# Patient Record
Sex: MALE | Race: WHITE | NOT HISPANIC OR LATINO | Employment: OTHER | URBAN - METROPOLITAN AREA
[De-identification: names, ages, dates, MRNs, and addresses within clinical notes are randomized per-mention and may not be internally consistent; named-entity substitution may affect disease eponyms.]

---

## 2021-03-29 ENCOUNTER — HOSPITAL ENCOUNTER (EMERGENCY)
Facility: CLINIC | Age: 66
Discharge: HOME | End: 2021-03-29
Attending: EMERGENCY MEDICINE | Admitting: EMERGENCY MEDICINE
Payer: MEDICARE

## 2021-03-29 ENCOUNTER — APPOINTMENT (OUTPATIENT)
Dept: CT IMAGING | Facility: CLINIC | Age: 66
End: 2021-03-29
Attending: EMERGENCY MEDICINE
Payer: MEDICARE

## 2021-03-29 VITALS
OXYGEN SATURATION: 96 % | WEIGHT: 232 LBS | SYSTOLIC BLOOD PRESSURE: 132 MMHG | BODY MASS INDEX: 30.75 KG/M2 | TEMPERATURE: 98.9 F | RESPIRATION RATE: 20 BRPM | HEART RATE: 91 BPM | HEIGHT: 73 IN | DIASTOLIC BLOOD PRESSURE: 86 MMHG

## 2021-03-29 DIAGNOSIS — N20.1 URETEROLITHIASIS: ICD-10-CM

## 2021-03-29 DIAGNOSIS — R10.9 RIGHT FLANK PAIN: ICD-10-CM

## 2021-03-29 LAB
ALBUMIN UR-MCNC: NEGATIVE MG/DL
ANION GAP SERPL CALCULATED.3IONS-SCNC: 2 MMOL/L (ref 3–14)
APPEARANCE UR: CLEAR
BASOPHILS # BLD AUTO: 0.1 10E9/L (ref 0–0.2)
BASOPHILS NFR BLD AUTO: 0.6 %
BILIRUB UR QL STRIP: NEGATIVE
BUN SERPL-MCNC: 15 MG/DL (ref 7–30)
CALCIUM SERPL-MCNC: 9 MG/DL (ref 8.5–10.1)
CHLORIDE SERPL-SCNC: 108 MMOL/L (ref 94–109)
CO2 SERPL-SCNC: 30 MMOL/L (ref 20–32)
COLOR UR AUTO: ABNORMAL
CREAT SERPL-MCNC: 0.98 MG/DL (ref 0.66–1.25)
DIFFERENTIAL METHOD BLD: NORMAL
EOSINOPHIL # BLD AUTO: 0.1 10E9/L (ref 0–0.7)
EOSINOPHIL NFR BLD AUTO: 1.1 %
ERYTHROCYTE [DISTWIDTH] IN BLOOD BY AUTOMATED COUNT: 12.6 % (ref 10–15)
GFR SERPL CREATININE-BSD FRML MDRD: 79 ML/MIN/{1.73_M2}
GLUCOSE SERPL-MCNC: 91 MG/DL (ref 70–99)
GLUCOSE UR STRIP-MCNC: NEGATIVE MG/DL
HCT VFR BLD AUTO: 41.8 % (ref 40–53)
HGB BLD-MCNC: 14.4 G/DL (ref 13.3–17.7)
HGB UR QL STRIP: ABNORMAL
IMM GRANULOCYTES # BLD: 0 10E9/L (ref 0–0.4)
IMM GRANULOCYTES NFR BLD: 0.2 %
KETONES UR STRIP-MCNC: NEGATIVE MG/DL
LEUKOCYTE ESTERASE UR QL STRIP: NEGATIVE
LYMPHOCYTES # BLD AUTO: 2 10E9/L (ref 0.8–5.3)
LYMPHOCYTES NFR BLD AUTO: 22.1 %
MCH RBC QN AUTO: 31 PG (ref 26.5–33)
MCHC RBC AUTO-ENTMCNC: 34.4 G/DL (ref 31.5–36.5)
MCV RBC AUTO: 90 FL (ref 78–100)
MONOCYTES # BLD AUTO: 0.9 10E9/L (ref 0–1.3)
MONOCYTES NFR BLD AUTO: 9.5 %
MUCOUS THREADS #/AREA URNS LPF: PRESENT /LPF
NEUTROPHILS # BLD AUTO: 6 10E9/L (ref 1.6–8.3)
NEUTROPHILS NFR BLD AUTO: 66.5 %
NITRATE UR QL: NEGATIVE
NRBC # BLD AUTO: 0 10*3/UL
NRBC BLD AUTO-RTO: 0 /100
PH UR STRIP: 5 PH (ref 5–7)
PLATELET # BLD AUTO: 231 10E9/L (ref 150–450)
POTASSIUM SERPL-SCNC: 3.9 MMOL/L (ref 3.4–5.3)
RBC # BLD AUTO: 4.64 10E12/L (ref 4.4–5.9)
RBC #/AREA URNS AUTO: 2 /HPF (ref 0–2)
SODIUM SERPL-SCNC: 140 MMOL/L (ref 133–144)
SOURCE: ABNORMAL
SP GR UR STRIP: 1.02 (ref 1–1.03)
SQUAMOUS #/AREA URNS AUTO: 0 /HPF (ref 0–1)
UROBILINOGEN UR STRIP-MCNC: 0 MG/DL (ref 0–2)
WBC # BLD AUTO: 9 10E9/L (ref 4–11)
WBC #/AREA URNS AUTO: 1 /HPF (ref 0–5)

## 2021-03-29 PROCEDURE — 99285 EMERGENCY DEPT VISIT HI MDM: CPT | Mod: 25

## 2021-03-29 PROCEDURE — 80048 BASIC METABOLIC PNL TOTAL CA: CPT | Performed by: EMERGENCY MEDICINE

## 2021-03-29 PROCEDURE — 74176 CT ABD & PELVIS W/O CONTRAST: CPT

## 2021-03-29 PROCEDURE — 81001 URINALYSIS AUTO W/SCOPE: CPT | Performed by: EMERGENCY MEDICINE

## 2021-03-29 PROCEDURE — 85025 COMPLETE CBC W/AUTO DIFF WBC: CPT | Performed by: EMERGENCY MEDICINE

## 2021-03-29 PROCEDURE — 250N000011 HC RX IP 250 OP 636: Performed by: EMERGENCY MEDICINE

## 2021-03-29 PROCEDURE — 96374 THER/PROPH/DIAG INJ IV PUSH: CPT

## 2021-03-29 PROCEDURE — 87086 URINE CULTURE/COLONY COUNT: CPT | Performed by: EMERGENCY MEDICINE

## 2021-03-29 RX ORDER — KETOROLAC TROMETHAMINE 15 MG/ML
15 INJECTION, SOLUTION INTRAMUSCULAR; INTRAVENOUS ONCE
Status: COMPLETED | OUTPATIENT
Start: 2021-03-29 | End: 2021-03-29

## 2021-03-29 RX ORDER — TAMSULOSIN HYDROCHLORIDE 0.4 MG/1
0.4 CAPSULE ORAL DAILY
Qty: 7 CAPSULE | Refills: 0 | Status: SHIPPED | OUTPATIENT
Start: 2021-03-29 | End: 2021-04-05

## 2021-03-29 RX ADMIN — KETOROLAC TROMETHAMINE 15 MG: 15 INJECTION, SOLUTION INTRAMUSCULAR; INTRAVENOUS at 18:19

## 2021-03-29 SDOH — HEALTH STABILITY: MENTAL HEALTH: HOW OFTEN DO YOU HAVE A DRINK CONTAINING ALCOHOL?: NEVER

## 2021-03-29 ASSESSMENT — MIFFLIN-ST. JEOR: SCORE: 1886.23

## 2021-03-29 NOTE — ED PROVIDER NOTES
"  History   Chief Complaint:  Flank Pain      HPI   Donovan Galindo is a 65 year old male, with a history of a pancreatic mass and kidney stones, who presents to the ED for evaluation of flank pain. The patient reports right-sided flank pain waking him up around 0200 this morning. He states the pain is intermittent, sharp, and uncomfortable. [facility] who told him to go to the ED for further evaluation. Upon evaluation, endorses some nausea throughout the day. Denies dysuria, hematuria, cough, shortness of breath, vomiting, diarrhea, fevers, or chills. Has not taken any medications for the pain.    Review of Systems   All other systems reviewed and are negative.    Allergies:  No Known Allergies    Medications:    The patient is not currently taking any prescribed medications.    Past Medical History:    Pancreatic mass  Benign neoplasm of colon  Basal cell carcinoma    Past Surgical History:    Hand surgery - left thumb    Family History:    Skin cancer  CAD  Diabetes    Social History:  Marital Status:   Smoking status: 1 pipe/week  Alcohol use: 3 shots whiskey/week  Drug use: No  Presents to the ED with self    Physical Exam     Patient Vitals for the past 24 hrs:   BP Temp Temp src Pulse Resp SpO2 Height Weight   03/29/21 1354 (!) 161/84 98.9  F (37.2  C) Temporal 93 16 96 % 1.854 m (6' 1\") 105.2 kg (232 lb)       Physical Exam  General: Resting on the bed.  Head: No obvious trauma to head.  Ears, Nose, Throat:  External ears normal.  Nose normal.    Eyes:  Conjunctivae clear.  Pupils are equal, round, and reactive.   Neck: Normal range of motion.  Neck supple.   CV: Regular rate and rhythm.  No murmurs.      Respiratory: Effort normal and breath sounds normal.  No wheezing or crackles.   Gastrointestinal: Soft.  No distension. There is no tenderness.  There is no rigidity, no rebound and no guarding.   Musculoskeletal: right cva tenderness   Neuro: Alert. Moving all extremities " appropriately.  Normal speech.    Skin: Skin is warm and dry.  No rash noted.     Emergency Department Course   Imaging:  Radiology findings were communicated with the patient who voiced understanding of the findings.    CT Abd/pelvis without contrast:    Reading per radiology    CT Abdomen Pelvis w/o Contrast   Preliminary Result   IMPRESSION: 4 mm stone in the distal right ureter with associated mild   hydroureter/hydronephrosis. No other radiodense kidney or ureteral   stone. No left hydronephrosis.             Laboratory:  Laboratory findings were communicated with the patient who voiced understanding of the findings.    CBC: WBC: 9.0, HGB: 14.4, PLT: 231    BMP: Anion Gap: 2 (L), o/w WNL (Creatinine: 0.98)    UA: Blood: Small, Mucous: Present, o/w Negative     Emergency Department Course:    Reviewed:  I reviewed the patient's nursing notes, vitals, past medical records, Care Everywhere.     Assessments:   I first assessed the patient and performed an exam. Discussed plans for care.     I rechecked the patient and updated them on their results. Discussed plans for discharge.    Interventions:  : Toradol 15 mg IV    Disposition:  The patient was discharged to home.       Impression & Plan      Medical Decision Makin-year-old male presents with right flank pain.  Vital signs are reassuring.  Broad differential was pursued include not limited to nephrolithiasis, pyelonephritis, electrolyte, metabolic, renal dysfunction, appendicitis, musculoskeletal, etc.  Overall patient's well-appearing nontoxic.  CBC shows no leukocytosis or anemia.  BMP shows no acute electrolyte, metabolic or renal dysfunction.  UA does have evidence of small blood but no other signs of infection.  He is right flank pain on examination.  CT shows 4 mm distal nearly UVJ stone.  He was given Toradol for pain.  His pain is controlled.  He does not wish to have narcotics or other pain medications.  Advise follow-up with  urology.  Straining for stone.  Drink plenty of fluids.  Flomax prescription sent.  Patient will use Tylenol and ibuprofen for pain.  Return precautions provided.  Patient feels comfortable this plan and will discharge with close follow-up with his primary doctor and urology.    Diagnosis:     ICD-10-CM    1. Right flank pain  R10.9    2. Ureterolithiasis  N20.1        Discharge Medications:  New Prescriptions    TAMSULOSIN (FLOMAX) 0.4 MG CAPSULE    Take 1 capsule (0.4 mg) by mouth daily for 7 days        Scribe Disclosure:  Shanda DOBSON, am serving as a scribe on 3/29/2021 at 4:47 PM to personally document services performed by Jane Arriaga MD based on my observations and the provider's statements to me.           Jane Arriaga MD  03/29/21 2889

## 2021-03-29 NOTE — DISCHARGE INSTRUCTIONS
Please drink plenty of fluids.  Strain for stones.  Return if fevers, nausea or vomiting, intractable pain or other acute changes.  May use ibuprofen and tylenol for pain.  Follow up with urology in a week.    Discharge Instructions  Kidney Stones    Kidney stones are a common problem that can cause a lot of pain but fortunately are usually not dangerous. Kidney stones form in the kidney and then can cause a blockage (obstruction) of the flow of urine from the kidney which leads to pain. Most patients can manage kidney stones at home (without a hospital stay).  However, sometimes your condition may be worse than it seemed at first, or may get worse with time. Most kidney stones will pass on their own, but occasionally stones may need to be removed by an urologist.    Generally, every Emergency Department visit should have a follow-up clinic visit with either a primary or a specialty clinic/provider. Please follow-up as instructed by your emergency provider today.      Return to the Emergency Department if:  Your pain is not controlled despite the medications provided or recommended.  You are vomiting (throwing up) and cannot keep fluids or medications down.  You develop a fever (>100.4 F).  You feel much more ill or develop new symptoms.  What can I do to help myself?  Be sure to drink plenty of fluids.  If instructed to do so, strain your urine (pee) with the urine strainer you were provided with today. Your stone may look like a grain of sand or a small pebble. Collect any stones in the cup provided and bring to your follow-up appointment.  Staying active is good, and may help the stone to pass. You may do whatever you feel up to doing without restrictions.   Treatment:  Non-steroidal anti-inflammatory drugs (NSAIDs). This includes prescription medicines like Toradol  (ketorolac) and non-prescription medicines like Advil  (ibuprofen) and Nuprin  (ibuprofen) and Naproxen. These pain relievers are very effective for  kidney stones.  Nausea (sick to your stomach) medication.  Nausea and vomiting are common with kidney stones, so your provider may send you home with medicine for this.   Flomax  (tamsulosin). This medicine is sometimes used for men with prostate problems, but also can help kidney stones to pass. Its effectiveness is controversial or questionable so it is prescribed in certain situations. This medicine can lower blood pressure, and you may feel faint/lightheaded, especially when you first stand up. Be sure to get up gradually, sit down if you feel faint, and avoid activity where feeling faint would be dangerous, such as climbing ladders.  If you were given a prescription for medicine here today, be sure to read all of the information (including the package insert) that comes with your prescription.  This will include important information about the medicine, its side effects, and any warnings that you need to know about.  The pharmacist who fills the prescription can provide more information and answer questions you may have about the medicine.  If you have questions or concerns that the pharmacist cannot address, please call or return to the Emergency Department.   Remember that you can always come back to the Emergency Department if you are not able to see your regular provider in the amount of time listed above, if you get any new symptoms, or if there is anything that worries you.

## 2021-03-29 NOTE — ED TRIAGE NOTES
Right flank pain starting this AM that woke him from sleep. [facility] eval.

## 2021-03-31 ENCOUNTER — HOSPITAL ENCOUNTER (EMERGENCY)
Facility: CLINIC | Age: 66
Discharge: HOME | End: 2021-03-31
Attending: EMERGENCY MEDICINE | Admitting: EMERGENCY MEDICINE
Payer: MEDICARE

## 2021-03-31 VITALS
DIASTOLIC BLOOD PRESSURE: 82 MMHG | WEIGHT: 235 LBS | OXYGEN SATURATION: 100 % | SYSTOLIC BLOOD PRESSURE: 145 MMHG | HEIGHT: 73 IN | BODY MASS INDEX: 31.14 KG/M2 | TEMPERATURE: 98.1 F | HEART RATE: 81 BPM | RESPIRATION RATE: 16 BRPM

## 2021-03-31 DIAGNOSIS — N20.1 URETERAL STONE: ICD-10-CM

## 2021-03-31 LAB
ALBUMIN UR-MCNC: NEGATIVE MG/DL
ANION GAP SERPL CALCULATED.3IONS-SCNC: 2 MMOL/L (ref 3–14)
APPEARANCE UR: CLEAR
BACTERIA SPEC CULT: NO GROWTH
BILIRUB UR QL STRIP: NEGATIVE
BUN SERPL-MCNC: 17 MG/DL (ref 7–30)
CALCIUM SERPL-MCNC: 8.5 MG/DL (ref 8.5–10.1)
CHLORIDE SERPL-SCNC: 106 MMOL/L (ref 94–109)
CO2 SERPL-SCNC: 29 MMOL/L (ref 20–32)
COLOR UR AUTO: NORMAL
CREAT SERPL-MCNC: 1.29 MG/DL (ref 0.66–1.25)
GFR SERPL CREATININE-BSD FRML MDRD: 57 ML/MIN/{1.73_M2}
GLUCOSE SERPL-MCNC: 98 MG/DL (ref 70–99)
GLUCOSE UR STRIP-MCNC: NEGATIVE MG/DL
HGB UR QL STRIP: NEGATIVE
KETONES UR STRIP-MCNC: NEGATIVE MG/DL
LEUKOCYTE ESTERASE UR QL STRIP: NEGATIVE
Lab: NORMAL
NITRATE UR QL: NEGATIVE
PH UR STRIP: 5 PH (ref 5–7)
POTASSIUM SERPL-SCNC: 4 MMOL/L (ref 3.4–5.3)
RBC #/AREA URNS AUTO: 1 /HPF (ref 0–2)
SODIUM SERPL-SCNC: 137 MMOL/L (ref 133–144)
SOURCE: NORMAL
SP GR UR STRIP: 1.01 (ref 1–1.03)
SPECIMEN SOURCE: NORMAL
SQUAMOUS #/AREA URNS AUTO: 0 /HPF (ref 0–1)
UROBILINOGEN UR STRIP-MCNC: 0 MG/DL (ref 0–2)
WBC #/AREA URNS AUTO: 1 /HPF (ref 0–5)

## 2021-03-31 PROCEDURE — 80048 BASIC METABOLIC PNL TOTAL CA: CPT | Performed by: EMERGENCY MEDICINE

## 2021-03-31 PROCEDURE — 250N000011 HC RX IP 250 OP 636: Performed by: EMERGENCY MEDICINE

## 2021-03-31 PROCEDURE — 81001 URINALYSIS AUTO W/SCOPE: CPT | Performed by: EMERGENCY MEDICINE

## 2021-03-31 PROCEDURE — 99284 EMERGENCY DEPT VISIT MOD MDM: CPT | Mod: 25

## 2021-03-31 PROCEDURE — 96374 THER/PROPH/DIAG INJ IV PUSH: CPT

## 2021-03-31 PROCEDURE — 250N000013 HC RX MED GY IP 250 OP 250 PS 637: Performed by: EMERGENCY MEDICINE

## 2021-03-31 RX ORDER — KETOROLAC TROMETHAMINE 15 MG/ML
15 INJECTION, SOLUTION INTRAMUSCULAR; INTRAVENOUS ONCE
Status: COMPLETED | OUTPATIENT
Start: 2021-03-31 | End: 2021-03-31

## 2021-03-31 RX ORDER — OXYCODONE AND ACETAMINOPHEN 5; 325 MG/1; MG/1
1 TABLET ORAL ONCE
Status: COMPLETED | OUTPATIENT
Start: 2021-03-31 | End: 2021-03-31

## 2021-03-31 RX ORDER — OXYCODONE HYDROCHLORIDE 5 MG/1
5 TABLET ORAL EVERY 4 HOURS PRN
Qty: 12 TABLET | Refills: 0 | Status: SHIPPED | OUTPATIENT
Start: 2021-03-31 | End: 2021-04-05

## 2021-03-31 RX ORDER — ONDANSETRON 4 MG/1
4 TABLET, ORALLY DISINTEGRATING ORAL EVERY 8 HOURS PRN
Qty: 10 TABLET | Refills: 0 | Status: SHIPPED | OUTPATIENT
Start: 2021-03-31 | End: 2021-04-03

## 2021-03-31 RX ADMIN — KETOROLAC TROMETHAMINE 15 MG: 15 INJECTION, SOLUTION INTRAMUSCULAR; INTRAVENOUS at 21:27

## 2021-03-31 RX ADMIN — OXYCODONE HYDROCHLORIDE AND ACETAMINOPHEN 1 TABLET: 5; 325 TABLET ORAL at 21:10

## 2021-03-31 ASSESSMENT — MIFFLIN-ST. JEOR: SCORE: 1899.83

## 2021-03-31 ASSESSMENT — ENCOUNTER SYMPTOMS
FLANK PAIN: 1
DIFFICULTY URINATING: 0
FEVER: 0

## 2021-03-31 NOTE — RESULT ENCOUNTER NOTE
Final urine culture report is NEGATIVE  No change in treatment per [facility] ED Lab Result Urine Culture protocol.

## 2021-04-01 NOTE — ED PROVIDER NOTES
"  History   Chief Complaint:  Flank Pain     HPI   Donovan Galindo is a 65 year old male with a recently visualized kidney stone who presents for evaluation of flank pain. On 3/29 the patient was seen here in the ED by Dr. Arriaga for right flank pain that began that morning. He had a CT scan showing a 4 mm stone in the distal right ureter with mild hydroureter / hydronephrosis. He declined a prescription for narcotic pain medication and was discharged with Flomax. Since returning home his flank pain has worsened and he presents to the ED today primarily requesting a prescription for stronger pain medication. He has been using Ibuprofen at home with limited improvement of his pain. His pain has not been migrating. He has continued to urinate without difficulty and has not had any fever.     CT Abdomen Pelvis 3/29:  IMPRESSION: 4 mm stone in the distal right ureter with associated mild hydroureter/hydronephrosis. No other radiodense kidney or ureteral stone. No left hydronephrosis.    Review of Systems   Constitutional: Negative for fever.   Genitourinary: Positive for flank pain (right). Negative for difficulty urinating.   All other systems reviewed and are negative.    Allergies:  No known drug allergies      Medications:  Flomax     Past Medical History:    Pancreatic mass   Benign neoplasm of colon  Basal cell carcinoma   Kidney stones     Past Surgical History:    Left thumb surgery      Family History:    Skin cancer   CAD   Diabetes     Social History:  The patient presents to the ED alone, dropped off by his wife.   Marital status:      Physical Exam     Patient Vitals for the past 24 hrs:   BP Temp Temp src Pulse Resp SpO2 Height Weight   03/31/21 1834 (!) 147/82 -- -- -- -- -- -- --   03/31/21 1833 -- 98.1  F (36.7  C) Temporal 84 20 100 % 1.854 m (6' 1\") 106.6 kg (235 lb)       Physical Exam  General: Sitting up in bed, appears mildly uncomfortable  Eyes:  The pupils are equal and " round    Conjunctivae and sclerae are normal  ENT:    Wearing a mask  Neck:  Normal range of motion  CV:  Regular rate, regular rhythm     Skin warm and well perfused   Resp:  Non labored breathing on room air    No tachypnea    No cough heard  GI:  Abdomen is soft, there is no rigidity    No distension    No rebound tenderness     No abdominal tenderness    No flank tenderness  MS:  Normal muscular tone  Skin:  No rash or acute skin lesions noted  Neuro:   Awake, alert.      Speech is normal and fluent.    Face is symmetric.     Moves all extremities equally  Psych: Normal affect.  Appropriate interactions.    Emergency Department Course     Laboratory:  BMP: Anion gap 2 low, Creatinine 1.29 high, GFR estimate 57 low, o/w WNL   UA: WNL     Emergency Department Course:  Reviewed:  I reviewed nursing notes, vitals and past medical history    Assessments:  2050: I obtained history and examined the patient as noted above.     2303: I updated and reassessed the patient.     Interventions:  2110 Percocet 1 tablet PO   2127 Toradol 15 mg IV     Disposition:  The patient was discharged to home.      Impression & Plan     Medical Decision Making:  Donovan Galindo is a 65 year old male who presents complaining of flank pain.  Evaluation today was consistent with nephrolithiasis and renal colic.  This explains the patient's pain.  Based on history and exam and recent CT abdomen that shows right ureteral stone, I do not feel that there is evidence of other acute intraabdominal process at this time.  No indication for repeat CT today. UA was obtained and no overt signs of infection are present. Creatinine slightly increased from two days ago. Discussed continuing to keep hydrated, minimize ibuprofen.   Pain controlled in ED. The patient was able to tolerate PO.  Based on the patient's good response to symptomatic control, I feel that this patient can be managed expectantly with close outpatient follow up with urology.   The  patient was instructed to return to the emergency department for uncontrolled pain, fever or inability to tolerate oral liquids.  Prescriptions for Oxycodone and Zofran ODT were given. Discussed side effects of narcotics and recommended taking stool softeners if taking them.    Diagnosis:    ICD-10-CM    1. Ureteral stone  N20.1        Discharge Medications:  New Prescriptions    ONDANSETRON (ZOFRAN ODT) 4 MG ODT TAB    Take 1 tablet (4 mg) by mouth every 8 hours as needed    OXYCODONE (ROXICODONE) 5 MG TABLET    Take 1 tablet (5 mg) by mouth every 4 hours as needed for pain       Scribe Disclosure:  I, Will Stephenson, am serving as a scribe at 8:50 PM on 3/31/2021 to document services personally performed by Araseli Bañuelos MD based on my observations and the provider's statements to me.           Araseli Bañuelos MD  04/01/21 0100

## 2021-04-01 NOTE — DISCHARGE INSTRUCTIONS
Minimize ibuprofen use  Okay for tylenol up to 3000 mg per day  Oxycodone for more severe pain  Stool softener like miralax if taking the oxycodone  Zofran for nausea as needed    Discharge Instructions  Kidney Stones    Kidney stones are a common problem that can cause a lot of pain but fortunately are usually not dangerous. Kidney stones form in the kidney and then can cause a blockage (obstruction) of the flow of urine from the kidney which leads to pain. Most patients can manage kidney stones at home (without a hospital stay).  However, sometimes your condition may be worse than it seemed at first, or may get worse with time. Most kidney stones will pass on their own, but occasionally stones may need to be removed by an urologist.    Generally, every Emergency Department visit should have a follow-up clinic visit with either a primary or a specialty clinic/provider. Please follow-up as instructed by your emergency provider today.      Return to the Emergency Department if:  Your pain is not controlled despite the medications provided or recommended.  You are vomiting (throwing up) and cannot keep fluids or medications down.  You develop a fever (>100.4 F).  You feel much more ill or develop new symptoms.  What can I do to help myself?  Be sure to drink plenty of fluids.  If instructed to do so, strain your urine (pee) with the urine strainer you were provided with today. Your stone may look like a grain of sand or a small pebble. Collect any stones in the cup provided and bring to your follow-up appointment.  Staying active is good, and may help the stone to pass. You may do whatever you feel up to doing without restrictions.   Treatment:  Non-steroidal anti-inflammatory drugs (NSAIDs). This includes prescription medicines like Toradol  (ketorolac) and non-prescription medicines like Advil  (ibuprofen) and Nuprin  (ibuprofen) and Naproxen. These pain relievers are very effective for kidney stones.  Nausea (sick  to your stomach) medication.  Nausea and vomiting are common with kidney stones, so your provider may send you home with medicine for this.   Flomax  (tamsulosin). This medicine is sometimes used for men with prostate problems, but also can help kidney stones to pass. Its effectiveness is controversial or questionable so it is prescribed in certain situations. This medicine can lower blood pressure, and you may feel faint/lightheaded, especially when you first stand up. Be sure to get up gradually, sit down if you feel faint, and avoid activity where feeling faint would be dangerous, such as climbing ladders.  If you were given a prescription for medicine here today, be sure to read all of the information (including the package insert) that comes with your prescription.  This will include important information about the medicine, its side effects, and any warnings that you need to know about.  The pharmacist who fills the prescription can provide more information and answer questions you may have about the medicine.  If you have questions or concerns that the pharmacist cannot address, please call or return to the Emergency Department.   Remember that you can always come back to the Emergency Department if you are not able to see your regular provider in the amount of time listed above, if you get any new symptoms, or if there is anything that worries you.    Opioid Medication Information    You have been given a prescription for an opioid (narcotic) pain medicine and/or have received a pain medicine while here in the Emergency Department. These medicines can make you drowsy or impaired. You must not drive, operate dangerous equipment, or engage in any other dangerous activities while taking these medications. If you drive while taking these medications, you could be arrested for driving under the influence (DUI). Do not drink any alcohol while you are taking these medications.     Opioid pain medications can cause  addiction. If you have a history of chemical dependency of any type, you are at a higher risk of becoming addicted to pain medications.  Only take these prescribed medications to treat your pain when all other options have been tried. Take it for as short a time and as few doses as possible. Store your pain pills in a secure place, as they are frequently stolen and provide a dangerous opportunity for children or visitors in your house to start abusing these powerful medications. We will not replace any lost or stolen medicine.    If you do not finish your medication, it is a good idea to get rid of it but please do not flush it down the toilet. Please dispose of the remaining medication at a local pharmacy or law enforcement facility. The [place] [employer] has additional information on medication disposal: https://www.pca.[facility].[place]/living-green/managing-unwanted-medications.      Many prescription pain medications contain Tylenol  (acetaminophen), including Vicodin , Tylenol #3 , Norco , Lortab , and Percocet .  You should not take any extra pills of Tylenol  if you are using these prescription medications or you can get very sick.  Do not ever take more than 3000 mg of acetaminophen in any 24 hour period.    All opioids tend to cause constipation. Drink plenty of water and eat foods that have a lot of fiber, such as fruits, vegetables, prune juice, apple juice and high fiber cereal.  Take a laxative if you don t move your bowels at least every other day. Miralax , Milk of Magnesia, Colace , or Senna  can be used to keep you regular.

## 2021-04-05 ENCOUNTER — VIRTUAL VISIT (OUTPATIENT)
Dept: UROLOGY | Facility: CLINIC | Age: 66
End: 2021-04-05
Payer: MEDICARE

## 2021-04-05 VITALS — BODY MASS INDEX: 31.01 KG/M2 | WEIGHT: 234 LBS | HEIGHT: 73 IN

## 2021-04-05 DIAGNOSIS — N20.0 NEPHROLITHIASIS: Primary | ICD-10-CM

## 2021-04-05 DIAGNOSIS — R39.15 URINARY URGENCY: ICD-10-CM

## 2021-04-05 DIAGNOSIS — R35.1 NOCTURIA: ICD-10-CM

## 2021-04-05 PROCEDURE — 99203 OFFICE O/P NEW LOW 30 MIN: CPT | Mod: 95 | Performed by: PHYSICIAN ASSISTANT

## 2021-04-05 RX ORDER — ALLOPURINOL 100 MG/1
TABLET ORAL
COMMUNITY
Start: 2021-01-27

## 2021-04-05 ASSESSMENT — ENCOUNTER SYMPTOMS
VOMITING: 0
FEVER: 0
NAUSEA: 0
CHILLS: 0
MUSCULOSKELETAL NEGATIVE: 1
HEMATURIA: 0
FREQUENCY: 1
APPETITE CHANGE: 0
DIFFICULTY URINATING: 1
SHORTNESS OF BREATH: 0

## 2021-04-05 ASSESSMENT — MIFFLIN-ST. JEOR: SCORE: 1895.3

## 2021-04-05 ASSESSMENT — PAIN SCALES - GENERAL: PAINLEVEL: NO PAIN (0)

## 2021-04-05 NOTE — PROGRESS NOTES
"*SEND LINK TO CELL PHONE*  PT PASSED STONE.  PT TOOK HIS LAST FLOMAX THIS MORNING.  PT HAD PAIN YESTERDAY AND THAT IS WHEN PT THINKS THE STONE WAS MOVING AND PASSED STONE THIS MORNING.    Donovan is a 65 year old who is being evaluated via a billable video visit.      How would you like to obtain your AVS? Mail a copy  If the video visit is dropped, the invitation should be resent by: Text to cell phone: [phone]  Will anyone else be joining your video visit? No      Video Start Time: 1401  Video-Visit Details    Type of service:  Video Visit    Video End Time:1427    Originating Location (pt. Location): Home    Distant Location (provider location):  [facility]OLOG[facility]     Platform used for Video Visit: [employer]    CHIEF COMPLAINT/REASON FOR VISIT   Nephrolithais, ER follow up    HISTORY OF PRESENT ILLNESS   Mr. Galindo is a very pleasant 65-year-old gentleman, who presents today for follow-up regarding nephrolithiasis.  He was seen in the emergency department on March 29 with right flank pain that had began that morning.  He had a CT scan which showed a 4 mm stone in the distal right ureter with mild hydroureter hydronephrosis.  He described pain medication at that time.  He did take Flomax.  Patient returned to the emergency department on March 31 due to worsening flank pain.  He was taking ibuprofen at home, and this did not help his pain.  He was continuing to have no issues with urination and no fevers or chills.  Patient was given additional medication and continue with a trial of passage.    Patient presents today with follow-up.  He completed his Flomax.  He did take Tylenol and oxycodone last night.  Patient thought he initially passed his stone yesterday, but the item that came out was mushy and did not look like his previous stone.  It looked like a \"tick.\"    Patient believes he passed the stone this morning.  This was more gravel-like in nature.  He did catch it.  Patient is " going to his primary care provider tomorrow.    Patient has a history of nephrolithiasis in June 2016 when he presented to the emergency department with 2 mm stone.  Patient did not require procedure.  He does have a family history of nephrolithiasis in his brother.  He has not had a metabolic work-up for nephrolithiasis.    Patient denies any hematuria, dysuria, fevers, shortness of breath, chest pain, unexplained weight loss, bone pain, change in appetite, or unusual fatigue.  He does feel that he has been cold and a bit foggy recently.    He notes that he is having some difficulties with urgency and urge incontinence.  He also endorses nocturia 3-4 times.  This was improved with Flomax.  He does not restrict fluids prior to bedtime.  Patient does not often feel that he empties his bladder and does endorse double voiding.  His typical intake includes 4 cups of coffee in the morning, a couple of glasses of tea, and water in the evening.  Patient is in her before and does not eat meat.  He does ingest cheese.    Patient feels that the Flomax may have made him feel a little off.    The following portions of the patient's history were reviewed and updated as appropriate: allergies, current medications, past family history, past medical history, past social history, past surgical history, and problem list.     REVIEW OF SYSTEMS   Review of Systems   Constitutional: Negative for appetite change, chills and fever.   HENT: Negative.    Respiratory: Negative for shortness of breath.    Cardiovascular: Negative for chest pain.   Gastrointestinal: Negative for nausea and vomiting.   Genitourinary: Positive for difficulty urinating, frequency and urgency. Negative for hematuria.   Musculoskeletal: Negative.       Per HPI.     Allergies:  No known drug allergies      Medications:  Allopurinol     Past Medical History:    Pancreatic mass   Benign neoplasm of colon  Basal cell carcinoma   Kidney stones   Gout     Past Surgical  History:    Left thumb surgery       Family History:    Skin cancer   CAD   Diabetes   Nephrolithiasis in his only brother.    Social History:  Patient is .  He smokes a pipe.     Objective      PHYSICAL EXAM   GENERAL: Healthy, alert and no distress  EYES: Eyes grossly normal to inspection.  No discharge or erythema, or obvious scleral/conjunctival abnormalities.  HENT: Normal cephalic/atraumatic.  External ears, nose and mouth without ulcers or lesions.  No nasal drainage visible.  NECK: No asymmetry, visible masses or scars  RESP: No audible wheeze, cough, or visible cyanosis.  No visible retractions or increased work of breathing.    MS: No gross musculoskeletal defects noted.  Normal range of motion.  No visible edema.  SKIN: Visible skin clear. No significant rash, abnormal pigmentation or lesions.  NEURO: Cranial nerves grossly intact.  Mentation and speech appropriate for age.  PSYCH: Mentation appears normal, affect normal/bright, judgement and insight intact, normal speech and appearance well-groomed.     IMAGING     I personally reviewed the images.     Ct Abdomen Pelvis W/o Contrast    Result Date: 3/29/2021  CT ABDOMEN PELVIS WITHOUT CONTRAST   3/29/2021 6:19 PM HISTORY: Hematuria, unknown cause. TECHNIQUE: Noncontrast CT abdomen and pelvis was performed. Radiation dose for this scan was reduced using automated exposure control, adjustment of the mA and/or kV according to patient size, or iterative reconstruction technique. COMPARISON: CT abdomen pelvis on 6/18/2016 FINDINGS: Lower chest: Mild bibasilar pulmonary opacities, likely atelectasis. Right kidney: 4 mm stone in distal right ureter just proximal to the ureterovesical junction (series 3 image 212) with associated mild hydroureter/hydronephrosis. No other radiodense kidney/ureteral stone. Left kidney: No radiodense kidney/ureteral stone or hydronephrosis. Urinary bladder: Distended and unremarkable. Remainder of the abdomen and pelvis:  Limited evaluation of abdominal organs due to lack of IV contrast. The unenhanced liver, gallbladder, pancreas, spleen and adrenal glands are grossly unremarkable. No abnormally dilated bowel loops. Moderate amount of stool throughout the colon, nonspecifically, can be seen with constipation. No significant free fluid in the abdomen and pelvis. No free peritoneal or portal venous gas. Scattered atherosclerotic vascular calcification of the abdominal aorta. Bones and soft tissue: No suspicious osseous lesion.      IMPRESSION: 4 mm stone in the distal right ureter with associated mild hydroureter/hydronephrosis. No other radiodense kidney or ureteral stone. No left hydronephrosis. LINDA LIMA MD     Assessment & Plan    1. Nephrolithiasis    2. Urinary urgency    3. Nocturia      I had the pleasure today meeting with Mr. Galindo to discuss his nephrolithiasis.  Patient caught what he believes is his stone this morning.  He is going to see his primary care provider tomorrow.  I have recommended that he bring his stone with him and asked them to do an analysis of this to confirm that this is indeed the stone.  We should be able to view this through care everywhere.    I discussed with the patient that this is not his first stone episode.  Patient would like to try to avoid recurrence of stones.  I think a metabolic work-up would be reasonable.  I would recommend [facility].  We will then meet to discuss the results.  We discussed that we may need to change some items in his diet.  Patient does have what sounds to be high oxalate intake.  Calcium oxalate is the most common type of kidney stone, but analysis in the 24-hour urine will be helpful to determine what changes we can make to prevent stones in the future.    He also endorses lower urinary tract symptoms.  We discussed how this could be related to BPH as a man ages with the prostate continuing to grow.  We also discussed the possibility of overactivity in the  bladder.  He did note improvement on Flomax with his nocturia.    I have recommended that the patient have a nurse visit with a postvoid residual to determine if he is emptying his bladder appropriately.  If he has an elevated postvoid residual, I would recommend resuming Flomax or another medication his class such as Rapaflo or Uroxatrol to help with his urinary symptoms.    If patient is emptying well, we discussed trying to avoid bladder irritants such as caffeine.  We also discussed about possible pelvic floor strengthening and physical therapy.    Patient is in agreement with these plans.  All questions answered at this time.  We will plan on touching base when I have the ability to review his stone analysis and have his metabolic work-up back.    I spent a total of 36  minutes obtaining the HPI, examining the patient, documentation, counseling the patient on diagnosis and treatment on the day of the visit.    Signed by:       Ban Ho PA-C 4/5/2021 2:39 PM

## 2021-04-05 NOTE — PATIENT INSTRUCTIONS
Take stone to primary care provider for stone analysis.    [facility] metabolic work up for stone prevention.  Follow up visit with me to discuss.    Nurse visit in [place] to check how he is emptying.    Try to avoid bladder irritants such as caffeine.

## 2021-04-05 NOTE — LETTER
4/5/2021       RE: Donovan Galindo  [address] [place] [address]     Dear Colleague,    Thank you for referring your patient, Donovan Galindo, to the [facility] UROLOG[facility] [place] at [facility]. Please see a copy of my visit note below.    *SEND LINK TO CELL PHONE*  PT PASSED STONE.  PT TOOK HIS LAST FLOMAX THIS MORNING.  PT HAD PAIN YESTERDAY AND THAT IS WHEN PT THINKS THE STONE WAS MOVING AND PASSED STONE THIS MORNING.    Donovan is a 65 year old who is being evaluated via a billable video visit.      How would you like to obtain your AVS? Mail a copy  If the video visit is dropped, the invitation should be resent by: Text to cell phone: [phone]  Will anyone else be joining your video visit? No      Video Start Time: 1401  Video-Visit Details    Type of service:  Video Visit    Video End Time:1427    Originating Location (pt. Location): Home    Distant Location (provider location):  [facility] UROLOG[facility] CL[facility]IC [place]     Platform [facility] for Video Visit: [facility]    CHIEF COMPLAINT/REASON FOR VISIT   Nephrolithais, ER follow up    HISTORY OF PRESENT ILLNESS   Mr. Galindo is a very pleasant 65-year-old gentleman, who presents today for follow-up regarding nephrolithiasis.  He was seen in the emergency department on March 29 with right flank pain that had began that morning.  He had a CT scan which showed a 4 mm stone in the distal right ureter with mild hydroureter hydronephrosis.  He described pain medication at that time.  He did take Flomax.  Patient returned to the emergency department on March 31 due to worsening flank pain.  He was taking ibuprofen at home, and this did not help his pain.  He was continuing to have no issues with urination and no fevers or chills.  Patient was given additional medication and continue with a trial of passage.    Patient presents today with follow-up.  He completed his Flomax.  He did take Tylenol and  "oxycodone last night.  Patient thought he initially passed his stone yesterday, but the item that came out was mushy and did not look like his previous stone.  It looked like a \"tick.\"    Patient believes he passed the stone this morning.  This was more gravel-like in nature.  He did catch it.  Patient is going to his primary care provider tomorrow.    Patient has a history of nephrolithiasis in June 2016 when he presented to the emergency department with 2 mm stone.  Patient did not require procedure.  He does have a family history of nephrolithiasis in his brother.  He has not had a metabolic work-up for nephrolithiasis.    Patient denies any hematuria, dysuria, fevers, shortness of breath, chest pain, unexplained weight loss, bone pain, change in appetite, or unusual fatigue.  He does feel that he has been cold and a bit foggy recently.    He notes that he is having some difficulties with urgency and urge incontinence.  He also endorses nocturia 3-4 times.  This was improved with Flomax.  He does not restrict fluids prior to bedtime.  Patient does not often feel that he empties his bladder and does endorse double voiding.  His typical intake includes 4 cups of coffee in the morning, a couple of glasses of tea, and water in the evening.  Patient is in her before and does not eat meat.  He does ingest cheese.    Patient feels that the Flomax may have made him feel a little off.    The following portions of the patient's history were reviewed and updated as appropriate: allergies, current medications, past family history, past medical history, past social history, past surgical history, and problem list.     REVIEW OF SYSTEMS   Review of Systems   Constitutional: Negative for appetite change, chills and fever.   HENT: Negative.    Respiratory: Negative for shortness of breath.    Cardiovascular: Negative for chest pain.   Gastrointestinal: Negative for nausea and vomiting.   Genitourinary: Positive for difficulty " urinating, frequency and urgency. Negative for hematuria.   Musculoskeletal: Negative.       Per HPI.     Allergies:  No known drug allergies      Medications:  Allopurinol     Past Medical History:    Pancreatic mass   Benign neoplasm of colon  Basal cell carcinoma   Kidney stones   Gout     Past Surgical History:    Left thumb surgery       Family History:    Skin cancer   CAD   Diabetes   Nephrolithiasis in his only brother.    Social History:  Patient is .  He smokes a pipe.     Objective      PHYSICAL EXAM   GENERAL: Healthy, alert and no distress  EYES: Eyes grossly normal to inspection.  No discharge or erythema, or obvious scleral/conjunctival abnormalities.  HENT: Normal cephalic/atraumatic.  External ears, nose and mouth without ulcers or lesions.  No nasal drainage visible.  NECK: No asymmetry, visible masses or scars  RESP: No audible wheeze, cough, or visible cyanosis.  No visible retractions or increased work of breathing.    MS: No gross musculoskeletal defects noted.  Normal range of motion.  No visible edema.  SKIN: Visible skin clear. No significant rash, abnormal pigmentation or lesions.  NEURO: Cranial nerves grossly intact.  Mentation and speech appropriate for age.  PSYCH: Mentation appears normal, affect normal/bright, judgement and insight intact, normal speech and appearance well-groomed.     IMAGING     I personally reviewed the images.     Ct Abdomen Pelvis W/o Contrast    Result Date: 3/29/2021  CT ABDOMEN PELVIS WITHOUT CONTRAST   3/29/2021 6:19 PM HISTORY: Hematuria, unknown cause. TECHNIQUE: Noncontrast CT abdomen and pelvis was performed. Radiation dose for this scan was reduced using automated exposure control, adjustment of the mA and/or kV according to patient size, or iterative reconstruction technique. COMPARISON: CT abdomen pelvis on 6/18/2016 FINDINGS: Lower chest: Mild bibasilar pulmonary opacities, likely atelectasis. Right kidney: 4 mm stone in distal right ureter  just proximal to the ureterovesical junction (series 3 image 212) with associated mild hydroureter/hydronephrosis. No other radiodense kidney/ureteral stone. Left kidney: No radiodense kidney/ureteral stone or hydronephrosis. Urinary bladder: Distended and unremarkable. Remainder of the abdomen and pelvis: Limited evaluation of abdominal organs due to lack of IV contrast. The unenhanced liver, gallbladder, pancreas, spleen and adrenal glands are grossly unremarkable. No abnormally dilated bowel loops. Moderate amount of stool throughout the colon, nonspecifically, can be seen with constipation. No significant free fluid in the abdomen and pelvis. No free peritoneal or portal venous gas. Scattered atherosclerotic vascular calcification of the abdominal aorta. Bones and soft tissue: No suspicious osseous lesion.      IMPRESSION: 4 mm stone in the distal right ureter with associated mild hydroureter/hydronephrosis. No other radiodense kidney or ureteral stone. No left hydronephrosis. LINDA LIMA MD     Assessment & Plan    1. Nephrolithiasis    2. Urinary urgency    3. Nocturia      I had the pleasure today meeting with Mr. Galindo to discuss his nephrolithiasis.  Patient caught what he believes is his stone this morning.  He is going to see his primary care provider tomorrow.  I have recommended that he bring his stone with him and asked them to do an analysis of this to confirm that this is indeed the stone.  We should be able to view this through care everywhere.    I discussed with the patient that this is not his first stone episode.  Patient would like to try to avoid recurrence of stones.  I think a metabolic work-up would be reasonable.  I would recommend [facility].  We will then meet to discuss the results.  We discussed that we may need to change some items in his diet.  Patient does have what sounds to be high oxalate intake.  Calcium oxalate is the most common type of kidney stone, but analysis in the  24-hour urine will be helpful to determine what changes we can make to prevent stones in the future.    He also endorses lower urinary tract symptoms.  We discussed how this could be related to BPH as a man ages with the prostate continuing to grow.  We also discussed the possibility of overactivity in the bladder.  He did note improvement on Flomax with his nocturia.    I have recommended that the patient have a nurse visit with a postvoid residual to determine if he is emptying his bladder appropriately.  If he has an elevated postvoid residual, I would recommend resuming Flomax or another medication his class such as Rapaflo or Uroxatrol to help with his urinary symptoms.    If patient is emptying well, we discussed trying to avoid bladder irritants such as caffeine.  We also discussed about possible pelvic floor strengthening and physical therapy.    Patient is in agreement with these plans.  All questions answered at this time.  We will plan on touching base when I have the ability to review his stone analysis and have his metabolic work-up back.    I spent a total of 36  minutes obtaining the HPI, examining the patient, documentation, counseling the patient on diagnosis and treatment on the day of the visit.    Signed by:       aBn Ho PA-C 4/5/2021 2:39 PM       Again, thank you for allowing me to participate in the care of your patient.      Sincerely,    Ban Ho PA-C

## 2021-04-05 NOTE — LETTER
Date:April 11, 2021      Patient was self referred, no letter generated. Do not send.        [facility] Health Information

## 2021-04-06 ENCOUNTER — TRANSFERRED RECORDS (OUTPATIENT)
Dept: HEALTH INFORMATION MANAGEMENT | Facility: CLINIC | Age: 66
End: 2021-04-06

## 2021-04-08 ENCOUNTER — TELEPHONE (OUTPATIENT)
Dept: UROLOGY | Facility: CLINIC | Age: 66
End: 2021-04-08

## 2021-04-08 NOTE — TELEPHONE ENCOUNTER
----- Message from Parvin Escobar sent at 4/8/2021  9:50 AM CDT -----  [facility] metabolic work up for stone prevention.  Follow up visit with me to discuss.    Nurse visit in [place] to check how he is emptying.    MELLISSA

## 2021-04-15 ENCOUNTER — ALLIED HEALTH/NURSE VISIT (OUTPATIENT)
Dept: UROLOGY | Facility: CLINIC | Age: 66
End: 2021-04-15
Payer: MEDICARE

## 2021-04-15 DIAGNOSIS — R39.15 URINARY URGENCY: ICD-10-CM

## 2021-04-15 DIAGNOSIS — R35.1 NOCTURIA: ICD-10-CM

## 2021-04-15 DIAGNOSIS — N20.0 NEPHROLITHIASIS: Primary | ICD-10-CM

## 2021-04-15 LAB — RESIDUAL VOLUME (RV) (EXTERNAL): 26

## 2021-04-15 PROCEDURE — 51798 US URINE CAPACITY MEASURE: CPT

## 2021-04-15 NOTE — PROGRESS NOTES
Chief Complaint   Patient presents with     Nocturia     Patient here today for PVR        There were no vitals taken for this visit. There is no height or weight on file to calculate BMI.    Patient Active Problem List   Diagnosis     Pancreatic mass       No Known Allergies    Current Outpatient Medications   Medication Sig Dispense Refill     allopurinol (ZYLOPRIM) 100 MG tablet TAKE 1 TABLET BY MOUTH EVERY DAY       NO ACTIVE MEDICATIONS .         Social History     Tobacco Use     Smoking status: Current Every Day Smoker     Types: Pipe     Smokeless tobacco: Never Used   Substance Use Topics     Alcohol use: Yes     Frequency: Never     Comment: occas.     Drug use: Never       PVR 26ML     Per patient state that he no longer take the flomax  State he still get up at night.      Sandy Pop, [facility]  4/15/2021  1:56 PM

## 2021-04-15 NOTE — NURSING NOTE
Chief Complaint   Patient presents with     Nocturia     Patient here today for PVR        There were no vitals taken for this visit. There is no height or weight on file to calculate BMI.    Patient Active Problem List   Diagnosis     Pancreatic mass       No Known Allergies    Current Outpatient Medications   Medication Sig Dispense Refill     allopurinol (ZYLOPRIM) 100 MG tablet TAKE 1 TABLET BY MOUTH EVERY DAY       NO ACTIVE MEDICATIONS .         Social History     Tobacco Use     Smoking status: Current Every Day Smoker     Types: Pipe     Smokeless tobacco: Never Used   Substance Use Topics     Alcohol use: Yes     Frequency: Never     Comment: occas.     Drug use: Never           Sandy Pop [facility]  4/15/2021  1:55 PM

## 2021-04-16 ENCOUNTER — TELEPHONE (OUTPATIENT)
Dept: UROLOGY | Facility: CLINIC | Age: 66
End: 2021-04-16

## 2021-04-16 NOTE — TELEPHONE ENCOUNTER
Called Donovan with the results and information below. Will mail him the list of bladder irritants. He expressed understanding.  JOSH Covington RN        ----- Message from Ban Ho PA-C sent at 4/16/2021 10:00 AM CDT -----  Please let him know that he is emptying his bladder well.  If he is still bothered by his urinary symptoms, I would have him consider avoiding the bladder irritants below:    Below is a list of things that can irritate the bladder and you should try to avoid to see if it improves your symptoms:     Caffeinated soft drinks.  Coffee.  Tea.  Chocolate.  Tomato-based foods.  Acidic juices and fruits. (includes cranberry juice)  Alcohol.  Carbonated drinks.  Aspartame/Nutrasweet (artificial sweeteners)  Vitamin C supplements and citrus fruit  Spicy food    If this does not improve his symptoms, I would consider having him go back on the Flomax, as it helped his nocturia.

## 2021-04-26 ENCOUNTER — TRANSFERRED RECORDS (OUTPATIENT)
Dept: HEALTH INFORMATION MANAGEMENT | Facility: CLINIC | Age: 66
End: 2021-04-26

## 2021-05-14 ENCOUNTER — VIRTUAL VISIT (OUTPATIENT)
Dept: UROLOGY | Facility: CLINIC | Age: 66
End: 2021-05-14
Payer: MEDICARE

## 2021-05-14 VITALS — HEIGHT: 73 IN | BODY MASS INDEX: 30.48 KG/M2 | WEIGHT: 230 LBS

## 2021-05-14 DIAGNOSIS — N20.0 NEPHROLITHIASIS: ICD-10-CM

## 2021-05-14 DIAGNOSIS — R39.15 URINARY URGENCY: Primary | ICD-10-CM

## 2021-05-14 DIAGNOSIS — R35.1 NOCTURIA: ICD-10-CM

## 2021-05-14 PROCEDURE — 99213 OFFICE O/P EST LOW 20 MIN: CPT | Mod: 95 | Performed by: PHYSICIAN ASSISTANT

## 2021-05-14 RX ORDER — OXYBUTYNIN CHLORIDE 5 MG/1
5 TABLET, EXTENDED RELEASE ORAL DAILY
Qty: 30 TABLET | Refills: 11 | Status: SHIPPED | OUTPATIENT
Start: 2021-05-14

## 2021-05-14 ASSESSMENT — PAIN SCALES - GENERAL: PAINLEVEL: NO PAIN (0)

## 2021-05-14 ASSESSMENT — MIFFLIN-ST. JEOR: SCORE: 1877.15

## 2021-05-14 NOTE — PROGRESS NOTES
Donovan is a 65 year old who is being evaluated via a billable video visit.      How would you like to obtain your AVS? Mail a copy  If the video visit is dropped, the invitation should be resent by: Text to cell phone: [phone]  Will anyone else be joining your video visit? No    Ericka Maldonado CMA    Video-Visit Details    Type of service:  Video Visit  Video Start Time: 1328    Video End Time:1347    Originating Location (pt. Location): Home    Distant Location (provider location):  [facility] [place]     Platform used for Video Visit: eMagin     CHIEF COMPLAINT/REASON FOR VISIT   Urinary urgency and nocturia follow up  LithoLink follow up    HISTORY OF PRESENT ILLNESS   Mr. Galindo is a pleasant 65-year-old gentleman, who presents today for nephrolithiasis and urinary urgency follow-up.  Patient passed a stone.  This was taken to his primary care provider.  Final analysis of this showed 90% calcium oxalate monohydrate and 10% calcium oxalate dihydrate.    Patient has not had any recurrent nephrolithiasis.  He has been trying to work on his water intake.  Patient had a nurse visit to check he was emptying his bladder as he noted nocturia, urgency, and rare urge incontinence.  He also notes that there may be a psychological component to this as when his mind wanders he is able to suppress this.  He does have nocturia several times, but he is not particularly bothered by this.  He does have some good nights where he does not get up as much.  Patient has a history of gout and already takes allopurinol.  Postvoid residual was 26 mL.    Patient also completed a Litholink for nephrolithiasis prevention.  He is here to discuss this.  He has been trying to avoid bladder irritants.  He has not noticed much of a change.    The following portions of the patient's history were reviewed and updated as appropriate: allergies, current medications, past family history, past medical history, past  social history, past surgical history, and problem list.     REVIEW OF SYSTEMS   Review of Systems   Genitourinary: Positive for urgency.      Per HPI.     Patient Active Problem List   Diagnosis     Pancreatic mass     Nephrolithiasis     Urinary urgency      Past Medical History:   Diagnosis Date     Gout       Objective      PHYSICAL EXAM   GENERAL: Healthy, alert and no distress  EYES: Eyes grossly normal to inspection.  No discharge or erythema, or obvious scleral/conjunctival abnormalities.  HENT: Normal cephalic/atraumatic.  External ears, nose and mouth without ulcers or lesions.  No nasal drainage visible.  NECK: No asymmetry, visible masses or scars  RESP: No audible wheeze, cough, or visible cyanosis.  No visible retractions or increased work of breathing.    MS: No gross musculoskeletal defects noted.  Normal range of motion.  No visible edema.  SKIN: Visible skin clear. No significant rash, abnormal pigmentation or lesions.  NEURO: Cranial nerves grossly intact.  Mentation and speech appropriate for age.  PSYCH: Mentation appears normal, affect normal/bright, judgement and insight intact, normal speech and appearance well-groomed.     LABORATORY     Recent Labs   Lab Test 03/31/21  2125 06/18/16  1603 06/18/16  1603   COLOR Straw   < > Yellow   APPEARANCE Clear   < > Clear   URINEGLC Negative   < > Negative   URINEBILI Negative   < > Negative   URINEKETONE Negative   < > Trace*   SG 1.010   < > 1.020   UBLD Negative   < > Small*   URINEPH 5.0   < > 5.0   PROTEIN Negative   < > Negative   UROBILINOGEN  --   --  0.2   NITRITE Negative   < > Negative   LEUKEST Negative   < > Negative   RBCU 1   < > O - 2   WBCU 1   < > O - 2    < > = values in this interval not displayed.     Litholink shows suboptimal urine volume at 1.99 L.  He has a very low pH at 5.315.  Main risk factor for uric acid stones.  Can treat with potassium citrate 20-60 milliequivalents per day in 2-3 doses.  Check serum potassium.  Of  unable to tolerate, consider sodium bicarbonate.  Mild uric acid supersaturation.  If stones are uric acid managed volume and pH to reduce S as below 1.  TESTING    PVR: 26 mL.    Assessment & Plan    1. Urinary urgency    2. Nocturia    3. Nephrolithiasis      I had the pleasure today meeting with Mr. Galindo to discuss his nephrolithiasis, nocturia, and urinary urgency.  We reviewed his Litholink results.  We discussed that he does need to continue to drink more water.  His urine output was 1.99 L.  We would recommend that he aim for 2.5 to 3 L of urine output daily.  He will continue to work on this.  We also discussed that his Litholink shows risk for uric acid stones due to a very low urine pH and mild uric acid supersaturation.  Patient stones have been calcium oxalate.  He is aware of this risk.  He is already on allopurinol, which may be helping prevent this from occurring.  If patient has uric acid stones in the future, will need consideration of increasing his urine pH with possible potassium citrate or bicarbonate.    Patient has tried to avoid bladder irritants.  He is encouraged to continue to try this to see if this will help with his urinary urgency and urge incontinence.  We also discussed distraction techniques such as subtracting by serial sevens from 100.  We also discussed that patient empties his bladder well with a postvoid residual of 26 mL.  We could consider a trial of an overactive bladder medication such as oxybutynin.  Patient would like to trial 5 mg of oxybutynin extended release.  We discussed that he can trial this on demand, but it does tend to work better if you take it continually.  Possible side effects include dry mouth, dry eyes, constipation, and difficulties with sweating.  Some people feel mentally foggy on it.  If patient has difficulties with this, he can discontinue it.  This was sent to his local pharmacy.    Patient will plan on letting me know if he would like follow-up  in approximately 3 months.    Also, discussed PT for urge suppression, but this will not really help the nocturia.    Signed by:       Ban Ho PA-C 5/18/2021 10:34 AM

## 2021-05-14 NOTE — LETTER
5/14/2021       RE: Donovan Galindo  [address] [place] [address]     Dear Colleague,    Thank you for referring your patient, Donovan Galindo, to the [facility] [place] at [facility]. Please see a copy of my visit note below.    Donovan is a 65 year old who is being evaluated via a billable video visit.      How would you like to obtain your AVS? Mail a copy  If the video visit is dropped, the invitation should be resent by: Text to cell phone: [phone]  Will anyone else be joining your video visit? No    Ericka Maldonado CMA    Video-Visit Details    Type of service:  Video Visit  Video Start Time: 1328    Video End Time:1347    Originating Location (pt. Location): Home    Distant Location (provider location):  [facility] [place]     Platform used for Video Visit: DoxEchoPixel     CHIEF COMPLAINT/REASON FOR VISIT   Urinary urgency and nocturia follow up  LithoLink follow up    HISTORY OF PRESENT ILLNESS   Mr. Galindo is a pleasant 65-year-old gentleman, who presents today for nephrolithiasis and urinary urgency follow-up.  Patient passed a stone.  This was taken to his primary care provider.  Final analysis of this showed 90% calcium oxalate monohydrate and 10% calcium oxalate dihydrate.    Patient has not had any recurrent nephrolithiasis.  He has been trying to work on his water intake.  Patient had a nurse visit to check he was emptying his bladder as he noted nocturia, urgency, and rare urge incontinence.  He also notes that there may be a psychological component to this as when his mind wanders he is able to suppress this.  He does have nocturia several times, but he is not particularly bothered by this.  He does have some good nights where he does not get up as much.  Patient has a history of gout and already takes allopurinol.  Postvoid residual was 26 mL.    Patient also completed a Litholink for  nephrolithiasis prevention.  He is here to discuss this.  He has been trying to avoid bladder irritants.  He has not noticed much of a change.    The following portions of the patient's history were reviewed and updated as appropriate: allergies, current medications, past family history, past medical history, past social history, past surgical history, and problem list.     REVIEW OF SYSTEMS   Review of Systems   Genitourinary: Positive for urgency.      Per HPI.     Patient Active Problem List   Diagnosis     Pancreatic mass     Nephrolithiasis     Urinary urgency      Past Medical History:   Diagnosis Date     Gout       Objective      PHYSICAL EXAM   GENERAL: Healthy, alert and no distress  EYES: Eyes grossly normal to inspection.  No discharge or erythema, or obvious scleral/conjunctival abnormalities.  HENT: Normal cephalic/atraumatic.  External ears, nose and mouth without ulcers or lesions.  No nasal drainage visible.  NECK: No asymmetry, visible masses or scars  RESP: No audible wheeze, cough, or visible cyanosis.  No visible retractions or increased work of breathing.    MS: No gross musculoskeletal defects noted.  Normal range of motion.  No visible edema.  SKIN: Visible skin clear. No significant rash, abnormal pigmentation or lesions.  NEURO: Cranial nerves grossly intact.  Mentation and speech appropriate for age.  PSYCH: Mentation appears normal, affect normal/bright, judgement and insight intact, normal speech and appearance well-groomed.     LABORATORY     Recent Labs   Lab Test 03/31/21  2125 06/18/16  1603 06/18/16  1603   COLOR Straw   < > Yellow   APPEARANCE Clear   < > Clear   URINEGLC Negative   < > Negative   URINEBILI Negative   < > Negative   URINEKETONE Negative   < > Trace*   SG 1.010   < > 1.020   UBLD Negative   < > Small*   URINEPH 5.0   < > 5.0   PROTEIN Negative   < > Negative   UROBILINOGEN  --   --  0.2   NITRITE Negative   < > Negative   LEUKEST Negative   < > Negative   RBCU 1   <  > O - 2   WBCU 1   < > O - 2    < > = values in this interval not displayed.     Litholink shows suboptimal urine volume at 1.99 L.  He has a very low pH at 5.315.  Main risk factor for uric acid stones.  Can treat with potassium citrate 20-60 milliequivalents per day in 2-3 doses.  Check serum potassium.  Of unable to tolerate, consider sodium bicarbonate.  Mild uric acid supersaturation.  If stones are uric acid managed volume and pH to reduce S as below 1.  TESTING    PVR: 26 mL.    Assessment & Plan    1. Urinary urgency    2. Nocturia    3. Nephrolithiasis      I had the pleasure today meeting with Mr. Galindo to discuss his nephrolithiasis, nocturia, and urinary urgency.  We reviewed his Litholink results.  We discussed that he does need to continue to drink more water.  His urine output was 1.99 L.  We would recommend that he aim for 2.5 to 3 L of urine output daily.  He will continue to work on this.  We also discussed that his Litholink shows risk for uric acid stones due to a very low urine pH and mild uric acid supersaturation.  Patient stones have been calcium oxalate.  He is aware of this risk.  He is already on allopurinol, which may be helping prevent this from occurring.  If patient has uric acid stones in the future, will need consideration of increasing his urine pH with possible potassium citrate or bicarbonate.    Patient has tried to avoid bladder irritants.  He is encouraged to continue to try this to see if this will help with his urinary urgency and urge incontinence.  We also discussed distraction techniques such as subtracting by serial sevens from 100.  We also discussed that patient empties his bladder well with a postvoid residual of 26 mL.  We could consider a trial of an overactive bladder medication such as oxybutynin.  Patient would like to trial 5 mg of oxybutynin extended release.  We discussed that he can trial this on demand, but it does tend to work better if you take it  continually.  Possible side effects include dry mouth, dry eyes, constipation, and difficulties with sweating.  Some people feel mentally foggy on it.  If patient has difficulties with this, he can discontinue it.  This was sent to his local pharmacy.    Patient will plan on letting me know if he would like follow-up in approximately 3 months.    Also, discussed PT for urge suppression, but this will not really help the nocturia.    Signed by:       Ban Ho PA-C 5/18/2021 10:34 AM       Again, thank you for allowing me to participate in the care of your patient.      Sincerely,    Ban Ho PA-C

## 2021-05-14 NOTE — LETTER
Date:May 20, 2021      Patient was self referred, no letter generated. Do not send.        [facility] Health Information

## 2021-05-18 PROBLEM — N20.0 NEPHROLITHIASIS: Status: ACTIVE | Noted: 2021-05-18

## 2021-05-18 PROBLEM — R39.15 URINARY URGENCY: Status: ACTIVE | Noted: 2021-05-18

## 2021-05-18 NOTE — PATIENT INSTRUCTIONS
Increase water intake to aim for urine output of 2.5 to 3 L daily.    Mild uric acid supersaturation and very low urine pH her risk factors for uric acid stones.  We will need to consider additional changes if you begin to form these type of stones.    Trial of oxybutynin 5 mg extended release once daily for urinary urgency, nocturia, and urge incontinence.    You have been prescribed medication to help relax your bladder.    This medication may help control (or improve) urinary frequency, urgency and urge incontinence.    Common side effects include:  Dry mouth (Biotene mouthwash can help with this.)  Constipation  Drowsiness/Mental Fogginess  Blurred vision/Dry Eyes  Difficulty with sweating    Rare side effect:  Urinary retention    Continue to try to avoid bladder irritants.    Will let me know if he would like follow-up in 3 months.

## 2023-11-20 ENCOUNTER — NURSE TRIAGE (OUTPATIENT)
Dept: NURSING | Facility: CLINIC | Age: 68
End: 2023-11-20
Payer: MEDICARE

## 2023-11-20 ENCOUNTER — VIRTUAL VISIT (OUTPATIENT)
Dept: FAMILY MEDICINE | Facility: CLINIC | Age: 68
End: 2023-11-20
Payer: MEDICARE

## 2023-11-20 DIAGNOSIS — U07.1 INFECTION DUE TO 2019 NOVEL CORONAVIRUS: Primary | ICD-10-CM

## 2023-11-20 PROCEDURE — 99203 OFFICE O/P NEW LOW 30 MIN: CPT | Mod: VID | Performed by: INTERNAL MEDICINE

## 2023-11-20 NOTE — PROGRESS NOTES
"    Instructions Relayed to Patient by Virtual Roomer:     Patient is active on [employer]:   Relayed following to patient: \"It looks like you are active on [email or web address], are you able to join the visit this way? If not, do you need us to send you a link now or would you like your provider to send a link via text or email when they are ready to initiate the visit?\"    Reminded patient to ensure they were logged on to virtual visit by arrival time listed. Documented in appointment notes if patient had flexibility to initiate visit sooner than arrival time. If pediatric virtual visit, ensured pediatric patient along with parent/guardian will be present for video visit.     Patient offered the website [email or web address] and/or phone number to [employer] Help line: [phone]      Donovan is a 68 year old who is being evaluated via a billable video visit.      How would you like to obtain your AVS? TryLifeharLumiary  If the video visit is dropped, the invitation should be resent by: Text to cell phone: [phone]  Will anyone else be joining your video visit? No          Assessment & Plan     Infection due to 2019 novel coronavirus  Has been feeling unwell for the past week or so  In the last couple of days he started having cough/feeling feverish/body aches/congestion  Tested positive  today for COVID  He went up north to [facility] and got exposed there  He  wants to explore the treatment options  Discussed about Paxlovid  Discussed about the side effects of the same including dry mouth/dizziness/nausea  Discussed about rebound COVID  Discussed about  rare skin reaction that can happen  Discussed about quarantine measures  No medication interactions found  Of note his GFR is normal but the electronic health record is only picking up his GFR from 3 years which was showing is 57 which is not true and he needs to be on a full dose of Paxlovid  He has a pulse oximeter at home and his saturations are around 98%  - nirmatrelvir " and ritonavir (PAXLOVID) 300 mg/100 mg therapy pack; Take 3 tablets by mouth 2 times daily for 5 days (Take 2 Nirmatrelvir tablets and 1 Ritonavir tablet twice daily for 5 days)      30 minutes spent by me on the date of the encounter doing chart review, history and exam, documentation and further activities per the note       Nicotine/Tobacco Cessation:  He reports that he has been smoking pipe. He has never used smokeless tobacco.  Nicotine/Tobacco Cessation Plan:   Information offered: Patient not interested at this time          Serafin Novak MD  [facility] CHRISTIANO Man is a 68 year old, presenting for the following health issues:  Covid Concern      11/20/2023     4:37 PM   Additional Questions   Roomed by william ROMERO       COVID-19 Symptom Review  How many days ago did these symptoms start? Symptoms started 11/15/2023, positive today 11/20/2023    Are any of the following symptoms significant for you?  New or worsening difficulty breathing? Yes  Please describe what kind of difficulty you are having breathing:Mild dyspnea (able to do ADLs without difficulty, mild shortness of breath with activities such as climbing one or two flights of stairs or walking briskly)  Worsening cough? Yes, I am coughing up mucus.  Fever or chills? Yes, I felt feverish or had chills.  Headache: Occasionally  Sore throat: YES  Chest pain: YES  Diarrhea: No  Body aches? YES    What treatments has patient tried? dayquil   Does patient live in a nursing home, group home, or shelter? No  Does patient have a way to get food/medications during quarantined? Yes, I have a friend or family member who can help me.                Review of Systems   Constitutional, HEENT, cardiovascular, pulmonary, gi and gu systems are negative, except as otherwise noted.      Objective           Vitals:  No vitals were obtained today due to virtual visit.    Physical Exam   GENERAL: Healthy, alert and no distress  EYES:  Eyes grossly normal to inspection.  No discharge or erythema, or obvious scleral/conjunctival abnormalities.  RESP: No audible wheeze, cough, or visible cyanosis.  No visible retractions or increased work of breathing.    SKIN: Visible skin clear. No significant rash, abnormal pigmentation or lesions.  NEURO: Cranial nerves grossly intact.  Mentation and speech appropriate for age.  PSYCH: Mentation appears normal, affect normal/bright, judgement and insight intact, normal speech and appearance well-groomed.                Video-Visit Details    Type of service:  Video Visit   Video Start Time: 510 PM  Video End Time:515 PM    Originating Location (pt. Location): Home    Distant Location (provider location):  Off-site  Platform used for Video Visit: [employer]

## 2023-11-20 NOTE — TELEPHONE ENCOUNTER
COVID Positive/Requesting COVID treatment    Patient is positive for COVID and requesting treatment options.    Date of positive COVID test (PCR or at home)? 11/20/23    Current COVID symptoms: fever or chills, cough, shortness of breath or difficulty breathing, fatigue, muscle or body aches, headache, new loss of taste or smell, sore throat, and congestion or runny nose    Date COVID symptoms began: 11/15/23    Message should be routed to clinic RN pool. Best phone number to use for call back: [phone]    Nurse Triage SBAR    Is this a 2nd Level Triage? YES, LICENSED PRACTITIONER REVIEW IS REQUIRED    Situation: Covid positive today    Background: Covid positive today    Assessment: Patient has been having symptoms for a week, thought he had a cold.  Patient tested negative 3 days ago but tested positive today.  Pulse is 68, oral temp 96.9F, oxygen level was 98%.  Patient states he is having chills.      Protocol Recommended Disposition:   Discuss With PCP And Callback By Nurse Within 1 Hour    Recommendation: Patient was warm transferred to the COVID  as patient is not established with [place].  Home care advice was given.  Patient verbalized understanding of instructions.         Does the patient meet one of the following criteria for ADS visit consideration? No    Reason for Disposition   MILD difficulty breathing (e.g., minimal/no SOB at rest, SOB with walking, pulse <100)    Additional Information   Negative: SEVERE difficulty breathing (e.g., struggling for each breath, speaks in single words)   Negative: Difficult to awaken or acting confused (e.g., disoriented, slurred speech)   Negative: Bluish (or gray) lips or face now   Negative: Shock suspected (e.g., cold/pale/clammy skin, too weak to stand, low BP, rapid pulse)   Negative: Sounds like a life-threatening emergency to the triager   Negative: SEVERE or constant chest pain or pressure  (Exception: Mild central chest pain, present only when  coughing.)   Negative: MODERATE difficulty breathing (e.g., speaks in phrases, SOB even at rest, pulse 100-120)   Negative: Headache and stiff neck (can't touch chin to chest)   Negative: Chest pain or pressure  (Exception: MILD central chest pain, present only when coughing.)   Negative: Drinking very little and dehydration suspected (e.g., no urine > 12 hours, very dry mouth, very lightheaded)   Negative: Patient sounds very sick or weak to the triager    Protocols used: Coronavirus (COVID-19) Diagnosed or [id number]

## 2024-03-21 ENCOUNTER — MYC MEDICAL ADVICE (OUTPATIENT)
Dept: FAMILY MEDICINE | Facility: CLINIC | Age: 69
End: 2024-03-21

## 2024-03-21 ENCOUNTER — TELEPHONE (OUTPATIENT)
Dept: FAMILY MEDICINE | Facility: CLINIC | Age: 69
End: 2024-03-21
Payer: MEDICARE

## 2024-03-21 NOTE — TELEPHONE ENCOUNTER
Patient Quality Outreach    Patient is due for the following:   Colon Cancer Screening  Physical Annual Wellness Visit      Topic Date Due    Polio Vaccine (2 of 3 - Adult catch-up series) [birth year]/1990       Next Steps:   Schedule a Annual Wellness Visit    Type of outreach:    Sent [employer] message.      Questions for provider review:    None           Roxanne Branch

## 2024-05-14 NOTE — TELEPHONE ENCOUNTER
Patient Quality Outreach    Patient is due for the following:   Colon Cancer Screening  Physical Annual Wellness Visit      Topic Date Due    Polio Vaccine (2 of 3 - Adult catch-up series) [birth year]22/1990    COVID-19 Vaccine (8 - 2023-24 season) 01/29/2024       Next Steps:   Schedule a Annual Wellness Visit    Type of outreach:    Sent [employer] message.      Questions for provider review:    None           Roxanne Branch

## 2024-08-06 ENCOUNTER — TELEPHONE (OUTPATIENT)
Dept: FAMILY MEDICINE | Facility: CLINIC | Age: 69
End: 2024-08-06
Payer: MEDICARE

## 2024-08-06 NOTE — TELEPHONE ENCOUNTER
Patient Quality Outreach    Patient is due for the following:   Colon Cancer Screening  Physical Annual Wellness Visit      Topic Date Due    Polio Vaccine (2 of 3 - Adult catch-up series) [birth year]22[birth year]1990    COVID-19 Vaccine (8 - 2023-24 season) 01/29/2024       Next Steps:   Schedule a Annual Wellness Visit    Type of outreach:    Sent [employer] message.      Questions for provider review:    None           Roxanne Branch

## 2024-10-13 ENCOUNTER — HEALTH MAINTENANCE LETTER (OUTPATIENT)
Age: 69
End: 2024-10-13

## 2024-11-05 ENCOUNTER — TELEPHONE (OUTPATIENT)
Dept: FAMILY MEDICINE | Facility: CLINIC | Age: 69
End: 2024-11-05
Payer: MEDICARE

## 2024-11-05 NOTE — TELEPHONE ENCOUNTER
Patient Quality Outreach    Patient is due for the following:   Colon Cancer Screening  Depression  -  PHQ-A needed  Physical Annual Wellness Visit      Topic Date Due    Flu Vaccine (1) 09/01/2024    COVID-19 Vaccine (8 - 2024-25 season) 09/01/2024       Next Steps:   Schedule a Annual Wellness Visit    Type of outreach:    Sent [employer] message.      Questions for provider review:    None           Roxanne Branch

## 2025-03-26 ENCOUNTER — TELEPHONE (OUTPATIENT)
Dept: FAMILY MEDICINE | Facility: CLINIC | Age: 70
End: 2025-03-26
Payer: MEDICARE

## 2025-03-26 ENCOUNTER — MYC MEDICAL ADVICE (OUTPATIENT)
Dept: FAMILY MEDICINE | Facility: CLINIC | Age: 70
End: 2025-03-26

## 2025-03-26 NOTE — TELEPHONE ENCOUNTER
Patient Quality Outreach    Patient is due for the following:   Diabetes -  Diabetic Follow-Up Visit  Colon Cancer Screening  Physical Annual Wellness Visit      Topic Date Due    Flu Vaccine (1) 09/01/2024    COVID-19 Vaccine (8 - 2024-25 season) 09/01/2024       Action(s) Taken:   Schedule a Annual Wellness Visit    Type of outreach:    Sent [employer] message.    Questions for provider review:    None         Roxanne Branch  Chart routed to .

## 2025-05-15 NOTE — TELEPHONE ENCOUNTER
Patient Quality Outreach    Patient is due for the following:   Diabetes -  Diabetic Follow-Up Visit  Colon Cancer Screening  Physical Annual Wellness Visit      Topic Date Due    COVID-19 Vaccine (8 - 2024-25 season) 09/01/2024       Action(s) Taken:   Schedule a Annual Wellness Visit    Type of outreach:    Sent [employer] message.    Questions for provider review:    None         Roxanne Branch  Chart routed to .